# Patient Record
Sex: FEMALE | Race: WHITE | Employment: OTHER | URBAN - METROPOLITAN AREA
[De-identification: names, ages, dates, MRNs, and addresses within clinical notes are randomized per-mention and may not be internally consistent; named-entity substitution may affect disease eponyms.]

---

## 2017-10-16 ENCOUNTER — HOSPITAL ENCOUNTER (OUTPATIENT)
Dept: PHYSICAL THERAPY | Age: 67
Discharge: HOME | End: 2017-10-16
Payer: MEDICARE

## 2017-10-16 PROCEDURE — 97112 NEUROMUSCULAR REEDUCATION: CPT

## 2017-10-16 PROCEDURE — 97162 PT EVAL MOD COMPLEX 30 MIN: CPT

## 2017-10-16 PROCEDURE — G8981 BODY POS CURRENT STATUS: HCPCS

## 2017-10-16 PROCEDURE — G8982 BODY POS GOAL STATUS: HCPCS

## 2017-10-16 NOTE — PROGRESS NOTES
PHYSICAL THERAPY - DAILY TREATMENT NOTE    Patient Name: Seymour Newby        Date: 10/16/2017  : 1950    Patient  Verified: yes  Visit #:   1   of   3-  Insurance: Payor: [address] [place],9D / Plan: [address] / Product Type: [employer] Medicare /      In time: 315 Out time: 415   Total Treatment Time: 60     Medicare Time Tracking (below)   Total Timed Codes (min):  15 1:1 Treatment Time:  60     TREATMENT AREA =  Other abnormalities of gait and mobility [R26.89]  Dizziness and giddiness [R42]    SUBJECTIVE  Pain Level (on 0 to 10 scale):  0  / 10   Medication Changes/New allergies or changes in medical history, any new surgeries or procedures?     NO    If yes, update Summary List   Subjective Functional Status/Changes:  []  No changes reported     See EVAL/ POC         OBJECTIVE  Modalities Rationale: to decrease pain, edema, neural compromise in order to perform ADLs/ rest with improved ease        min [] Estim, type/location:                                      []  att     []  unatt     []  w/US     []  w/ice    []  w/heat    min []  Mechanical Traction: type/lbs                   []  pro   []  sup   []  int   []  cont    []  before manual    []  after manual    min []  Ultrasound, settings/location:      min []  Iontophoresis w/ dexamethasone, location:                                               []  take home patch       []  in clinic    min []  Ice     []  Heat    location/position:     min []  Vasopneumatic Device, press/temp:     min []  Other:    [] Skin assessment post-treatment (if applicable):    []  intact    []  redness- no adverse reaction     []redness  adverse reaction:         min Therapeutic Exercise:  []  See flow sheet   Rationale:      increase ROM and increase strength to improve the patients ability to perform ADLs      min Manual Therapy:    Rationale:      decrease pain, increase ROM, increase tissue extensibility and decrease trigger points to improve patient's ability to perform ADLs    15 min Neuromuscular Re-ed: See flow sheet   Rationale:    improve coordination, improve balance, increase proprioception and improved posture, improve motor control to improve the patients ability to perform ADLs     min Gait Training:    Rationale:       min Patient Education:  YES  Reviewed HEP   []  Progressed/Changed HEP based on:         Other Objective/Functional Measures:    See EVAL/ POC     Post Treatment Pain Level (on 0 to 10) scale:   0  / 10     ASSESSMENT      [x]  See POC     PLAN  [x]  Upgrade activities as tolerated  Continue plan of care: yes   []  Discharge due to :    []  Other:      Therapist: Blayne Manriquez PT    Date: 10/16/2017 Time: 4:20 PM     Future Appointments  Date Time Provider Max Lees   10/26/2017 12:00 PM Blayne Manriquez PT REHAB CENTER AT [facility]   10/30/2017 12:30 PM Blayne Manriquez PT REHAB CENTER AT [facility]   11/6/2017 10:00  [address], PT REHAB CENTER AT [facility]

## 2017-10-16 NOTE — PROGRESS NOTES
[facility] PHYSICAL THERAPY AT [facility] [address] Deepthi Fox, [address] - Phone: [phone]  Fax: [phone] / [address] [facility]  Patient Name: Misha Anderson : 1950   Medical   Diagnosis: Other abnormalities of gait and mobility [R26.89]  Dizziness and giddiness [R42] Treatment Diagnosis: vertigo   Onset Date: 2016     Referral Source: Suzi Loja MD [facility]): 10/16/2017   Prior Hospitalization: See medical history Provider #: [id number]   Prior Level of Function: Ongoing episodes of dizziness   Comorbidities: Arthritis, HTN, asthma   Medications: Verified on Patient Summary List     The Plan of Care and following information is based on the information from the initial evaluation.     ==================================================================================  Assessment / key information:   Misha Anderson is a 75 y.o.  yo female with Dx of Other abnormalities of gait and mobility [R26.89]  Dizziness and giddiness [R42]. She reports a long history of episodic vertigo, this last episode persisting since 2016. Episodes can occur with onset of severe dizziness and nausea--then persist with a sense of being off balance. She reports that symptoms are made worse with being in buildings with high ceilings or with pressure changes. She Has difficulty lying flat or on her right side due toincrease in symptom intensity. Objectively, the patient demonstrates mildly decreased static/dynamic functional balance, particularly with eyes closed. She reports dizziness with ADLs ([address] = 43 points). Oculo-motor exam is unremarkable. Hallpike-Hortensia (right):-Hallpike-Hortensia (left)-Roll Test (right):-Roll Test (left): -.Pt will benefit from PT/Vestibular rehab to address these deficits, improve functional independence and reduce dizziness and imbalance with normal daily activities.  Thank you for this referral.   ===========================================================================================  Eval Complexity: History MEDIUM  Complexity : 1-2 comorbidities / personal factors will impact the outcome/ POC ;  Examination  MEDIUM Complexity : 3 Standardized tests and measures addressing body structure, function, activity limitation and / or participation in recreation ; Presentation MEDIUM Complexity : Evolving with changing characteristics ; Decision Making MEDIUM Complexity : FOTO score of 26-74; Overall Complexity MEDIUM  Problem List: impaired gait/ balance, decrease ADL/ functional abilitiies, decrease activity tolerance and decrease transfer abilities   Treatment Plan may include any combination of the following: Therapeutic exercise, Therapeutic activities, Neuromuscular re-education, Gait/balance training and Patient education  Patient / Family readiness to learn indicated by: asking questions, trying to perform skills and interest  Persons(s) to be included in education: patient (P)  Barriers to Learning/Limitations: no  Measures taken: None needed   Patient Goal (s): Less dizziness   Rehabilitation Potential: excellent  Self reported health status; good   Short Term Goals: To be accomplished in 4-6  treatments:  1. Patient will report at least 25% reduction of symptoms with ADLs. 2. Patient will be independent and complaint with HEP TID to reduce imbalance and dizziness with ADLs. 3. DHI will improve to less than or equal to 33points to demonstrate reduction of dizziness and imbalance with ADLs.  Long Term Goals: To be accomplished in 10-12 treatments:  1. Patient will report at least 50% reduction of symptoms with ADLs. 2. Patient will be independent with self progression of HEP and demonstrate willingness to continue HEP after D/C to maximize/maintain gains in functional mobility.   3. DHI will improve to less than or equal to 23 points to demonstrate significant reduction of dizziness and imbalance with ADLs. Frequency / Duration:   Patient to be seen  1  times per week for 3-4  treatments:  Patient / Caregiver education and instruction: self care, activity modification and exercises  G-Codes (GP): Position: [id number] Current  CK= 40-59%   Goal  CJ= 20-39%. The severity rating is based on the FOTO Score  Therapist Signature: René Wright PT Date: 58/75/0724   Certification Period: 10/16/17 to 1/13/18 Time: 4:29 PM   ===========================================================================================  I certify that the above Physical Therapy Services are being furnished while the patient is under my care. I agree with the treatment plan and certify that this therapy is necessary. Physician Signature:        Date:       Time:     Please sign and return to [facility] or you may fax the signed copy to [phone]. Thank you.

## 2017-10-26 ENCOUNTER — HOSPITAL ENCOUNTER (OUTPATIENT)
Dept: PHYSICAL THERAPY | Age: 67
Discharge: HOME | End: 2017-10-26
Payer: MEDICARE

## 2017-10-26 PROCEDURE — 97112 NEUROMUSCULAR REEDUCATION: CPT

## 2017-10-26 NOTE — PROGRESS NOTES
PHYSICAL THERAPY - DAILY TREATMENT NOTE    Patient Name: Christiano Haque        Date: 10/26/2017  : 1950   YES Patient  Verified  Visit #:   1   of   3-4  Insurance: Payor: Jorge Many / Plan: [place] MED[place] PART A & B / Product Type: Medicare /      In time: 1200 Out time: 4650   Total Treatment Time: 45     Medicare Time Tracking (below)   Total Timed Codes (min):  45 1:1 Treatment Time:  45     TREATMENT AREA = Other abnormalities of gait and mobility [R26.89]  Dizziness and giddiness [R42]    SUBJECTIVE  Pain Level (on 0 to 10 scale):  0  / 10   Medication Changes/New allergies or changes in medical history, any new surgeries or procedures? NO    If yes, update Summary List   Subjective Functional Status/Changes:  []  No changes reported     Last few days have been really bad. Weather change affects symptoms   Before that--did welll with exercises. Reports inability to read except on phone. OBJECTIVE        45 min Neuromuscular Re-ed: [x]  See flow sheet   Rationale:    improve balance, increase proprioception and dec dizziness to improve the patients ability to amb/ perform ADLs with less symptoms        throughout Rx min Patient Education:  YES  Reviewed HEP   []  Progressed/Changed HEP based on: Other Objective/Functional Measures: Added tracking and convergence exercise as well as dynamic gait activity= HHT, VHT, EC  FGA= /30   Post Treatment Pain Level (on 0 to 10) scale:   0  / 10     ASSESSMENT  Assessment/Changes in Function:     Functional improvement:  Compliant with HEP   Functional limitation:  Limited dynamic balance      []  See Progress Note/Recertification   Patient will continue to benefit from skilled PT services to modify and progress therapeutic interventions, address functional mobility deficits and address imbalance/dizziness to attain remaining goals.    Progress toward goals / Updated goals:    Progressed HEP     PLAN  [x]  Upgrade activities as tolerated YES Continue plan of care   []  Discharge due to :    []  Other:      Therapist: Rainey Krabbe, PT    Date: 10/26/2017 Time: 12:00 PM     Future Appointments  Date Time Provider Max Lees   10/30/2017 12:30 PM Rainey Krabbe, PT REHAB CENTER AT [facility]   11/6/2017 10:00  [address], PT REHAB CENTER AT [facility]

## 2017-10-30 ENCOUNTER — HOSPITAL ENCOUNTER (OUTPATIENT)
Dept: PHYSICAL THERAPY | Age: 67
Discharge: HOME | End: 2017-10-30
Payer: MEDICARE

## 2017-10-30 PROCEDURE — 97112 NEUROMUSCULAR REEDUCATION: CPT

## 2017-10-30 NOTE — PROGRESS NOTES
PHYSICAL THERAPY - DAILY TREATMENT NOTE    Patient Name: Yuan Booth        Date: 10/30/2017  : 1950   YES Patient  Verified  Visit #:   3   of   3-4  Insurance: Payor: Serge Grier / Plan: [place] MEDICARE PART A & B / Product Type: Medicare /      In time: 6624 Out time: 110   Total Treatment Time: 40     Medicare Time Tracking (below)   Total Timed Codes (min):  40 1:1 Treatment Time:  40     TREATMENT AREA = Other abnormalities of gait and mobility [R26.89]  Dizziness and giddiness [R42]    SUBJECTIVE  Pain Level (on 0 to 10 scale):  0  / 10   Medication Changes/New allergies or changes in medical history, any new surgeries or procedures? NO    If yes, update Summary List   Subjective Functional Status/Changes:  []  No changes reported     I have to wear earmuffs when it's cold. Otherwise, pretty good. Ex's are challenging. Have to use night lights where ever I go. Able to drive only short distances. Greatful that I can do that. Feet are nemb from the arthritis. OBJECTIVE        40 min Neuromuscular Re-ed: []  See flow sheet   Rationale:    improve coordination, improve balance and dec dizziness to improve the patients ability to amb/ drive/ perform ADLs         throughout Rx min Patient Education:  YES  Reviewed HEP   []  Progressed/Changed HEP based on: Other Objective/Functional Measures:    Difficulty with SLS- head turns- so modified to SR this visit    Added VVI to HEP     Post Treatment Pain Level (on 0 to 10) scale:   0  / 10     ASSESSMENT  Assessment/Changes in Function:     Functional improvement:  Progressed HEP   Functional limitatio: imbalance, tatyana with vision occuded      []  See Progress Note/Recertification   Patient will continue to benefit from skilled PT services to modify and progress therapeutic interventions, address functional mobility deficits and address imbalance/dizziness to attain remaining goals.    Progress toward goals / Updated goals:    Progressed HEP- patient demos compliance     PLAN  [x]  Upgrade activities as tolerated YES Continue plan of care   []  Discharge due to :    []  Other:      Therapist: Rowena Winchester PT    Date: 10/30/2017 Time: 12:32 PM     Future Appointments  Date Time Provider Max Lees   11/6/2017 10:00  [address], PT REHAB CENTER AT [facility]

## 2017-11-06 ENCOUNTER — HOSPITAL ENCOUNTER (OUTPATIENT)
Dept: PHYSICAL THERAPY | Age: 67
Discharge: HOME | End: 2017-11-06
Payer: MEDICARE

## 2017-11-06 PROCEDURE — G8983 BODY POS D/C STATUS: HCPCS

## 2017-11-06 PROCEDURE — G8982 BODY POS GOAL STATUS: HCPCS

## 2017-11-06 PROCEDURE — 97112 NEUROMUSCULAR REEDUCATION: CPT

## 2017-11-06 NOTE — PROGRESS NOTES
Jonah Fuentes [address]  [facility] PHYSICAL THERAPY AT [address] [facility], [address] [place] [address], [address], [address] [place] [address]  Phone: [phone]  Fax: [phone] SUMMARY FOR PHYSICAL THERAPY          Patient Name: Sarah Ring : 1950   Treatment/Medical Diagnosis: Other abnormalities of gait and mobility [R26.89]  Dizziness and giddiness [R42]   Onset Date: 2016    Referral Source: Cresencio Padilla MD [facility]): 2016   Prior Hospitalization: See Medical History Provider #: [id number]   Prior Level of Function: *Ongoing episodes of dizziness   Comorbidities: Arthritis, HTN, asthma   Medications: Verified on Patient Summary List   Visits from [facility]: 4 Missed Visits: 0        SUMMARY OF TREATMENT  Sarah Ring has been seen at our clinic 1-2x/wk for a total of 4 visits. Pt treatment has consisted of Gait and Balance training, Therapeutic activities, Neuromuscular re-education, and Patient education. CURRENT STATUS  Pt has had a good response to physical therapy treatment. She reports improved symptoms with cervical rotation. She continues to complain of difficulty and increased symptoms with forward bending . Pt is I with HEP to address current symptoms. Pt has met  All STGS and LTGs. Overall Improvement of Symptoms with ADLs: 25%   Dizziness Handicap Inventory = 40 ;improved from 43 at evaluation. Goal/Measure of Progress    1. Patient will report at least 25% reduction of symptoms to improve ADLs. Status at last Eval:  na  Current Status:  25%, met   2. Patient will be independent with self progression of HEP and demonstrate willingness to continue HEP after D/C to maximize/maintain gains in functional mobility. Status at last Eval:  na  Current Status:  met    3. DHI will improve to less than or equal to 23 points to demonstrate significant reduction of dizziness and imbalance with ADLs.     Status at last Eval:  43 Current Status:   40, good progress G-Codes (GP): Position: [id number] Goal  CJ= 20-39%  [id number] D/C  CJ= 20-39%. The severity rating is based on the FOTO Score  RECOMMENDATIONS  Discontinue therapy. Progressing towards or have reached established goals. If you have any questions/comments please contact us directly at [phone]. Thank you for allowing us to assist in the care of your patient.     Therapist Signature: Estella Yin PT Date: 11/06/17   Reporting Period: 10/16/17-11/06/17 Time: 10:28 AM

## 2017-11-06 NOTE — PROGRESS NOTES
PHYSICAL THERAPY - DAILY TREATMENT NOTE    Patient Name: Cruzito Gilbert        Date: 2017  : 1950   YES Patient  Verified  Visit #: 4   of   4  Insurance: Payor: Wilma Lee / Plan: [place] MEDICARE PART A & B / Product Type: Medicare /      In time: 10 Out time: 7780   Total Treatment Time: 40     Medicare Time Tracking (below)   Total Timed Codes (min):  40 1:1 Treatment Time:  40     TREATMENT AREA = Other abnormalities of gait and mobility [R26.89]  Dizziness and giddiness [R42]    SUBJECTIVE  Pain Level (on 0 to 10 scale):  0  / 10   Medication Changes/New allergies or changes in medical history, any new surgeries or procedures? NO    If yes, update Summary List   Subjective Functional Status/Changes:  []  No changes reported     Has learned exercises and pleased with response. Needs help with progression safely after D/C. OBJECTIVE      40 min Neuromuscular Re-ed: [x]  See flow sheet   Rationale:    improve coordination, improve balance and dec dizziness to improve the patients ability to amb/ drive/ perform ADLs       throughout Rx min Patient Education:  YES  Reviewed HEP   []  Progressed/Changed HEP based on: Other Objective/Functional Measures:    VVI to HEP. Updated HEP. Pt D/C prep completed. Post Treatment Pain Level (on 0 to 10) scale:   0  / 10     ASSESSMENT  Assessment/Changes in Function:     Pt has met all LTGs, would benefit from D/C at this time. Progress toward goals / Updated goals:    D/C to HEP, pt demo compliance. PLAN  [x]  Upgrade activities as tolerated YES Continue plan of care   []  Discharge due to :    []  Other:      Therapist: Blake Lopes, PT    Date: 2017 Time: 9:30 am     No future appointments.

## 2019-09-18 PROBLEM — Q64.4: Status: ACTIVE | Noted: 2019-09-18
